# Patient Record
Sex: MALE | Race: WHITE | Employment: FULL TIME | URBAN - NONMETROPOLITAN AREA
[De-identification: names, ages, dates, MRNs, and addresses within clinical notes are randomized per-mention and may not be internally consistent; named-entity substitution may affect disease eponyms.]

---

## 2022-06-09 ENCOUNTER — OFFICE VISIT (OUTPATIENT)
Dept: PRIMARY CARE CLINIC | Age: 37
End: 2022-06-09
Payer: COMMERCIAL

## 2022-06-09 VITALS
TEMPERATURE: 97.9 F | OXYGEN SATURATION: 96 % | HEIGHT: 62 IN | DIASTOLIC BLOOD PRESSURE: 78 MMHG | SYSTOLIC BLOOD PRESSURE: 126 MMHG | WEIGHT: 256 LBS | BODY MASS INDEX: 47.11 KG/M2 | HEART RATE: 105 BPM

## 2022-06-09 DIAGNOSIS — M77.8 TENDINITIS OF RIGHT ELBOW: Primary | ICD-10-CM

## 2022-06-09 PROBLEM — I10 HYPERTENSION: Status: ACTIVE | Noted: 2022-06-09

## 2022-06-09 PROBLEM — Z72.0 TOBACCO USE: Status: ACTIVE | Noted: 2022-06-09

## 2022-06-09 PROCEDURE — 99203 OFFICE O/P NEW LOW 30 MIN: CPT | Performed by: FAMILY MEDICINE

## 2022-06-09 RX ORDER — LISINOPRIL 10 MG/1
10 TABLET ORAL DAILY
COMMUNITY

## 2022-06-09 RX ORDER — METHYLPREDNISOLONE 4 MG/1
TABLET ORAL
Qty: 1 KIT | Refills: 0 | Status: SHIPPED | OUTPATIENT
Start: 2022-06-09 | End: 2022-06-15

## 2022-06-09 ASSESSMENT — PATIENT HEALTH QUESTIONNAIRE - PHQ9
SUM OF ALL RESPONSES TO PHQ QUESTIONS 1-9: 0
1. LITTLE INTEREST OR PLEASURE IN DOING THINGS: 0
SUM OF ALL RESPONSES TO PHQ9 QUESTIONS 1 & 2: 0
SUM OF ALL RESPONSES TO PHQ QUESTIONS 1-9: 0
SUM OF ALL RESPONSES TO PHQ QUESTIONS 1-9: 0
2. FEELING DOWN, DEPRESSED OR HOPELESS: 0
SUM OF ALL RESPONSES TO PHQ QUESTIONS 1-9: 0

## 2022-06-09 NOTE — PROGRESS NOTES
[facility]             [address], [address]                      Telephone [phone]             Fax [phone]     Joyce Ríos  :  1985  Age:  40 y.o. MRN:  [id number]  Date of visit:  2022       Assessment and Plan:    Tendinitis of right elbow  - methylPREDNISolone (MEDROL, JERMAINE,) 4 MG tablet; Take by mouth as directed per instructions on dose pack. Take with food. Dispense: 1 kit; Refill: 0      He was advised to follow up if symptoms worsen or do not resolve. Subjective:    Joyce Ríos is a 40 y.o. male who presents to [facility] today (2022) for evaluation of: Other (R elbow pain began 2-3 months ago. mostly when he tries to pick something up. )      He states that he has had pain in the right elbow for approximately two months. He denies injury. He is right-hand dominant. He does construction work. He states that he was using a hammer a lot yesterday, and the pain worsened. He has not taken any over the counter medications for the pain. He has the following problem list:  Patient Active Problem List   Diagnosis    Hypertension    Tobacco use        Current medications are:  Outpatient Medications Marked as Taking for the 22 encounter (Office Visit) with SCHEDULE, PROVIDER [facility]   Medication Sig Dispense Refill    lisinopril (PRINIVIL;ZESTRIL) 10 MG tablet Take 10 mg by mouth daily          He has No Known Allergies. He  reports that he has been smoking cigarettes. He has never used smokeless tobacco.      Objective:    Vitals:    22 1214   BP: 126/78   Pulse: (!) 105   Temp: 97.9 °F (36.6 °C)   TempSrc: Tympanic   SpO2: 96%   Weight: 256 lb (116.1 kg)   Height: 5' 2\" (1.575 m)     Body mass index is 46.82 kg/m². Well-nourished, well-developed male, healthy-appearing, alert, cooperative and in no acute distress.    The right elbow has